# Patient Record
(demographics unavailable — no encounter records)

---

## 2025-05-02 NOTE — HISTORY OF PRESENT ILLNESS
[N] : Patient denies prior pregnancies [Menarche Age: ____] : age at menarche was [unfilled] [No] : Patient does not have concerns regarding sex [TextBox_4] : Jihan is here c/o having a fishy vaginal odor.  She was treated with metrogel in the past and this helped, but when she had intercourse the odor came back. She has not been sexually active for 5+ months (getting ), but she is still having the odor. She is having less discharge than she had before.  Menses are every other month, she states this happens when she is stressed.  Normal TSH 5/2024  she is also c/o itching to her left inner thigh for months off and on.   [PapSmeardate] : 5/10/24 [TextBox_31] : NEG [TextBox_63] : NEG [GonorrheaDate] : 5/10/24 [ChlamydiaDate] : 5/10/24 [TextBox_68] : NEG [TrichomonasDate] : 5/10/24 [TextBox_73] : NEG [LMPDate] : 3/5/25 [PGHxTotal] : 0 [FreeTextEntry1] : 3/5/25

## 2025-05-02 NOTE — PHYSICAL EXAM
[Chaperoned Physical Exam] : A chaperone was present in the examining room during all aspects of the physical examination. [MA] : MA [FreeTextEntry2] : K [Appropriately responsive] : appropriately responsive [Alert] : alert [No Acute Distress] : no acute distress [Oriented x3] : oriented x3 [Labia Majora] : normal [Labia Minora] : normal [Normal] : normal [Uterine Adnexae] : normal [FreeTextEntry4] : very scant clear/whitish discharge

## 2025-05-02 NOTE — PLAN
[FreeTextEntry1] : normal exam today- will wait for cultures before treatment. We spoke generally about vaginal health, encouraged healthy eating, avoiding added sugars, etc.    She is worried about a fungal infection to her inner thigh- no obvious lesions noted. Pt advised to follow up with dermatology if the itching persists.  Recommend annual exam - pt will schedule.

## 2025-07-02 NOTE — PHYSICAL EXAM
[MA] : MA [FreeTextEntry2] : Lisa [Appropriately responsive] : appropriately responsive [Alert] : alert [No Acute Distress] : no acute distress [No Lymphadenopathy] : no lymphadenopathy [Regular Rate Rhythm] : regular rate rhythm [No Murmurs] : no murmurs [Clear to Auscultation B/L] : clear to auscultation bilaterally [Soft] : soft [Non-tender] : non-tender [Non-distended] : non-distended [No Lesions] : no lesions [Oriented x3] : oriented x3 [Labia Majora] : normal [Labia Minora] : normal [Normal] : normal [FreeTextEntry4] : no discharge

## 2025-07-02 NOTE — DISCUSSION/SUMMARY
[FreeTextEntry1] : Patient presents with concern for infection -  based on her description it was likely old blood at the end of her period.  I reviewed with her that this can happen.  She requested to be tested for infection anyways, and a vaginitis culture was collected. Will call with results.

## 2025-07-02 NOTE — HISTORY OF PRESENT ILLNESS
[Y] : Patient is sexually active [N] : Patient denies prior pregnancies [Menarche Age: ____] : age at menarche was [unfilled] [No] : Patient does not have concerns regarding sex [Currently Active] : currently active [PapSmeardate] : 05/10/24 [TextBox_31] : NEG [GonorrheaDate] : 05/10/24 [TextBox_63] : NEG [ChlamydiaDate] : 05/10/24 [TextBox_68] : NEG [LMPDate] : 06/10/25 [PGHxTotal] : 0 [FreeTextEntry1] : 06/10/25